# Patient Record
Sex: MALE | URBAN - METROPOLITAN AREA
[De-identification: names, ages, dates, MRNs, and addresses within clinical notes are randomized per-mention and may not be internally consistent; named-entity substitution may affect disease eponyms.]

---

## 2017-10-18 ENCOUNTER — APPOINTMENT (RX ONLY)
Dept: URBAN - METROPOLITAN AREA CLINIC 15 | Facility: CLINIC | Age: 22
Setting detail: DERMATOLOGY
End: 2017-10-18

## 2017-10-18 DIAGNOSIS — L23.89 ALLERGIC CONTACT DERMATITIS DUE TO OTHER AGENTS: ICD-10-CM

## 2017-10-18 DIAGNOSIS — L30.1 DYSHIDROSIS [POMPHOLYX]: ICD-10-CM

## 2017-10-18 DIAGNOSIS — L85.3 XEROSIS CUTIS: ICD-10-CM

## 2017-10-18 PROCEDURE — 99203 OFFICE O/P NEW LOW 30 MIN: CPT

## 2017-10-18 PROCEDURE — ? TREATMENT REGIMEN

## 2017-10-18 PROCEDURE — ? PRESCRIPTION

## 2017-10-18 PROCEDURE — ? COUNSELING

## 2017-10-18 RX ORDER — BETAMETHASONE/PROPYLENE GLYC 0.05 %
OINTMENT (GRAM) TOPICAL
Qty: 1 | Refills: 1 | Status: ERX | COMMUNITY
Start: 2017-10-18

## 2017-10-18 RX ORDER — MUPIROCIN 20 MG/G
OINTMENT TOPICAL
Qty: 1 | Refills: 2 | Status: ERX | COMMUNITY
Start: 2017-10-18

## 2017-10-18 RX ADMIN — MUPIROCIN: 20 OINTMENT TOPICAL at 15:56

## 2017-10-18 RX ADMIN — Medication: at 15:56

## 2017-10-18 ASSESSMENT — LOCATION SIMPLE DESCRIPTION DERM
LOCATION SIMPLE: RIGHT UPPER BACK
LOCATION SIMPLE: RIGHT HAND
LOCATION SIMPLE: LEFT HAND
LOCATION SIMPLE: ABDOMEN

## 2017-10-18 ASSESSMENT — LOCATION DETAILED DESCRIPTION DERM
LOCATION DETAILED: LEFT DORSAL MIDDLE METACARPOPHALANGEAL JOINT
LOCATION DETAILED: RIGHT ULNAR DORSAL HAND
LOCATION DETAILED: LEFT ULNAR DORSAL HAND
LOCATION DETAILED: RIGHT MEDIAL UPPER BACK
LOCATION DETAILED: XIPHOID
LOCATION DETAILED: RIGHT RADIAL DORSAL HAND

## 2017-10-18 ASSESSMENT — LOCATION ZONE DERM
LOCATION ZONE: TRUNK
LOCATION ZONE: HAND

## 2017-10-18 NOTE — HPI: RASH
How Severe Is Your Rash?: mild
Is This A New Presentation, Or A Follow-Up?: Rash
Additional History: Pt states the lesions comes and goes.